# Patient Record
Sex: FEMALE | Race: WHITE | NOT HISPANIC OR LATINO | URBAN - METROPOLITAN AREA
[De-identification: names, ages, dates, MRNs, and addresses within clinical notes are randomized per-mention and may not be internally consistent; named-entity substitution may affect disease eponyms.]

---

## 2017-12-21 ENCOUNTER — EMERGENCY (EMERGENCY)
Facility: HOSPITAL | Age: 72
LOS: 1 days | End: 2017-12-21

## 2017-12-21 DIAGNOSIS — R06.02 SHORTNESS OF BREATH: ICD-10-CM

## 2017-12-21 DIAGNOSIS — J45.909 UNSPECIFIED ASTHMA, UNCOMPLICATED: ICD-10-CM

## 2017-12-21 DIAGNOSIS — R74.8 ABNORMAL LEVELS OF OTHER SERUM ENZYMES: ICD-10-CM

## 2018-04-24 ENCOUNTER — OUTPATIENT (OUTPATIENT)
Dept: OUTPATIENT SERVICES | Facility: HOSPITAL | Age: 73
LOS: 1 days | Discharge: HOME | End: 2018-04-24

## 2018-04-24 VITALS — RESPIRATION RATE: 18 BRPM | HEART RATE: 88 BPM | SYSTOLIC BLOOD PRESSURE: 136 MMHG | DIASTOLIC BLOOD PRESSURE: 63 MMHG

## 2018-04-24 VITALS
DIASTOLIC BLOOD PRESSURE: 90 MMHG | WEIGHT: 169.98 LBS | SYSTOLIC BLOOD PRESSURE: 170 MMHG | HEART RATE: 103 BPM | RESPIRATION RATE: 20 BRPM | TEMPERATURE: 98 F | HEIGHT: 64 IN

## 2018-04-24 DIAGNOSIS — Z78.9 OTHER SPECIFIED HEALTH STATUS: Chronic | ICD-10-CM

## 2018-04-24 RX ORDER — MONTELUKAST 4 MG/1
1 TABLET, CHEWABLE ORAL
Qty: 0 | Refills: 0 | COMMUNITY

## 2018-04-27 DIAGNOSIS — Z43.1 ENCOUNTER FOR ATTENTION TO GASTROSTOMY: ICD-10-CM

## 2018-04-27 DIAGNOSIS — I10 ESSENTIAL (PRIMARY) HYPERTENSION: ICD-10-CM

## 2022-11-11 NOTE — ASU PATIENT PROFILE, ADULT - TEACHING/LEARNING FACTORS IMPACT ABILITY TO LEARN
No Refill Protocol on File:    Medication/Dose: vit d  Patient last seen by PCP: 08/11/22  Next office visit with PCP: none   Last Lab:08/11/22  Last Ordered: 08/15/22    Prescription does not require PDMP check    Sent to provider to approve or deny         none

## 2025-07-18 NOTE — PRE-ANESTHESIA EVALUATION ADULT - WEIGHT IN KG
This LSW visited patient and patients significant other, Abdirahman at bedside this am.  Discharge plans discussed.  Patient will transfer to [facility] upon discharge.  Patient does not require insurance authorization prior to discharge.  I have completed [id number] for SNF discharge.  Electronically signed by PAN Kaplan on 7/18/25 at 10:16 AM EDT     I completed 2nd IMM with patient, copy provided.  Electronically signed by PAN Kaplan on 7/18/25 at 11:04 AM EDT   This LSW was notified by Dr. Aguilar that patient is not medically cleared for discharge today. I notified Clarisa, admissions liaison at [facility].  Electronically signed by PAN Kaplan on 7/18/25 at 1:50 PM EDT       77.1